# Patient Record
Sex: FEMALE | Race: WHITE
[De-identification: names, ages, dates, MRNs, and addresses within clinical notes are randomized per-mention and may not be internally consistent; named-entity substitution may affect disease eponyms.]

---

## 2018-07-02 ENCOUNTER — HOSPITAL ENCOUNTER (EMERGENCY)
Dept: HOSPITAL 19 - COL.ER | Age: 23
Discharge: HOME | End: 2018-07-02
Payer: SELF-PAY

## 2018-07-02 VITALS — SYSTOLIC BLOOD PRESSURE: 134 MMHG | TEMPERATURE: 98.1 F | DIASTOLIC BLOOD PRESSURE: 91 MMHG

## 2018-07-02 VITALS — WEIGHT: 150.36 LBS | BODY MASS INDEX: 25.67 KG/M2 | HEIGHT: 64.02 IN

## 2018-07-02 VITALS — HEART RATE: 84 BPM

## 2018-07-02 DIAGNOSIS — F17.210: ICD-10-CM

## 2018-07-02 DIAGNOSIS — K02.9: Primary | ICD-10-CM

## 2018-10-16 ENCOUNTER — HOSPITAL ENCOUNTER (EMERGENCY)
Dept: HOSPITAL 19 - COL.ER | Age: 23
Discharge: HOME | End: 2018-10-16
Payer: MEDICAID

## 2018-10-16 VITALS — SYSTOLIC BLOOD PRESSURE: 117 MMHG | DIASTOLIC BLOOD PRESSURE: 48 MMHG | HEART RATE: 61 BPM | TEMPERATURE: 98.4 F

## 2018-10-16 VITALS — HEIGHT: 64.02 IN | BODY MASS INDEX: 29.09 KG/M2 | WEIGHT: 170.42 LBS

## 2018-10-16 DIAGNOSIS — Z3A.00: ICD-10-CM

## 2018-10-16 DIAGNOSIS — O23.90: ICD-10-CM

## 2018-10-16 DIAGNOSIS — R10.2: ICD-10-CM

## 2018-10-16 DIAGNOSIS — O20.0: ICD-10-CM

## 2018-10-16 DIAGNOSIS — O26.899: Primary | ICD-10-CM

## 2018-10-16 DIAGNOSIS — Z87.891: ICD-10-CM

## 2018-10-16 LAB
ALBUMIN SERPL-MCNC: 4.1 GM/DL (ref 3.5–5)
ALP SERPL-CCNC: 47 U/L (ref 50–136)
ALT SERPL-CCNC: 29 U/L (ref 9–52)
ANION GAP SERPL CALC-SCNC: 12 MMOL/L (ref 7–16)
AST SERPL-CCNC: 16 U/L (ref 15–37)
BASOPHILS # BLD: 0 10*3/UL (ref 0–0.2)
BASOPHILS NFR BLD AUTO: 0.3 % (ref 0–2)
BILIRUB SERPL-MCNC: 0.8 MG/DL (ref 0–1)
BUN SERPL-MCNC: 11 MG/DL (ref 7–17)
CALCIUM SERPL-MCNC: 9.3 MG/DL (ref 8.4–10.2)
CHLORIDE SERPL-SCNC: 101 MMOL/L (ref 98–107)
CO2 SERPL-SCNC: 28 MMOL/L (ref 22–30)
CREAT SERPL-SCNC: 0.44 MG/DL (ref 0.52–1.25)
EOSINOPHIL # BLD: 0.1 10*3/UL (ref 0–0.7)
EOSINOPHIL NFR BLD: 0.9 % (ref 0–4)
ERYTHROCYTE [DISTWIDTH] IN BLOOD BY AUTOMATED COUNT: 12.3 % (ref 11.5–14.5)
GLUCOSE SERPL-MCNC: 96 MG/DL (ref 74–106)
GRANULOCYTES # BLD AUTO: 75 % (ref 42.2–75.2)
HCT VFR BLD AUTO: 40.3 % (ref 37–47)
HGB BLD-MCNC: 13.9 G/DL (ref 12.5–16)
LYMPHOCYTES # BLD: 1.7 10*3/UL (ref 1.2–3.4)
LYMPHOCYTES NFR BLD: 18.5 % (ref 20–51)
MCH RBC QN AUTO: 30 PG (ref 27–31)
MCHC RBC AUTO-ENTMCNC: 35 G/DL (ref 33–37)
MCV RBC AUTO: 87 FL (ref 80–100)
MONOCYTES # BLD: 0.5 10*3/UL (ref 0.1–0.6)
MONOCYTES NFR BLD AUTO: 5 % (ref 1.7–9.3)
NEUTROPHILS # BLD: 6.8 10*3/UL (ref 1.4–6.5)
PH UR STRIP.AUTO: 6 [PH] (ref 5–8)
PLATELET # BLD AUTO: 253 K/MM3 (ref 130–400)
PMV BLD AUTO: 9.6 FL (ref 7.4–10.4)
POTASSIUM SERPL-SCNC: 4.1 MMOL/L (ref 3.4–5)
PROT SERPL-MCNC: 7.2 GM/DL (ref 6.4–8.2)
RBC # BLD AUTO: 4.61 M/MM3 (ref 4.1–5.3)
RBC # UR: (no result) /HPF
SODIUM SERPL-SCNC: 140 MMOL/L (ref 137–145)
SP GR UR STRIP.AUTO: 1.02 (ref 1–1.03)
SQUAMOUS # URNS: (no result) /HPF
URINE LEUKOCYTE ESTERASE: (no result)
URN COLLECT METHOD CLASS: (no result)

## 2018-10-19 ENCOUNTER — HOSPITAL ENCOUNTER (EMERGENCY)
Dept: HOSPITAL 19 - COL.ER | Age: 23
Discharge: HOME | End: 2018-10-19
Payer: MEDICAID

## 2018-10-19 VITALS — WEIGHT: 170.42 LBS | HEIGHT: 64.02 IN | BODY MASS INDEX: 29.09 KG/M2

## 2018-10-19 VITALS — SYSTOLIC BLOOD PRESSURE: 118 MMHG | HEART RATE: 84 BPM | DIASTOLIC BLOOD PRESSURE: 83 MMHG

## 2018-10-19 VITALS — TEMPERATURE: 97.2 F

## 2018-10-19 DIAGNOSIS — J06.9: ICD-10-CM

## 2018-10-19 DIAGNOSIS — J45.909: Primary | ICD-10-CM

## 2018-11-12 ENCOUNTER — HOSPITAL ENCOUNTER (EMERGENCY)
Dept: HOSPITAL 19 - COL.ER | Age: 23
Discharge: HOME | End: 2018-11-12
Payer: MEDICAID

## 2018-11-12 VITALS — HEIGHT: 64.02 IN | WEIGHT: 180.34 LBS | BODY MASS INDEX: 30.79 KG/M2

## 2018-11-12 VITALS — DIASTOLIC BLOOD PRESSURE: 68 MMHG | SYSTOLIC BLOOD PRESSURE: 128 MMHG | TEMPERATURE: 98 F | HEART RATE: 91 BPM

## 2018-11-12 DIAGNOSIS — Z3A.10: ICD-10-CM

## 2018-11-12 DIAGNOSIS — R55: ICD-10-CM

## 2018-11-12 DIAGNOSIS — O26.891: Primary | ICD-10-CM

## 2018-11-12 DIAGNOSIS — O99.331: ICD-10-CM

## 2018-11-12 DIAGNOSIS — Z98.890: ICD-10-CM

## 2018-11-12 DIAGNOSIS — F17.210: ICD-10-CM

## 2018-11-12 LAB
ALBUMIN SERPL-MCNC: 4.1 GM/DL (ref 3.5–5)
ALP SERPL-CCNC: 44 U/L (ref 50–136)
ALT SERPL-CCNC: 26 U/L (ref 9–52)
ANION GAP SERPL CALC-SCNC: 6 MMOL/L (ref 7–16)
AST SERPL-CCNC: 19 U/L (ref 15–37)
BASOPHILS # BLD: 0 10*3/UL (ref 0–0.2)
BASOPHILS NFR BLD AUTO: 0.3 % (ref 0–2)
BILIRUB SERPL-MCNC: 0.7 MG/DL (ref 0–1)
BUN SERPL-MCNC: 7 MG/DL (ref 7–17)
CALCIUM SERPL-MCNC: 9.3 MG/DL (ref 8.4–10.2)
CHLORIDE SERPL-SCNC: 105 MMOL/L (ref 98–107)
CO2 SERPL-SCNC: 27 MMOL/L (ref 22–30)
CREAT SERPL-SCNC: 0.43 MG/DL (ref 0.52–1.25)
DIGOXIN SERPL-MCNC: 38.5 NG/ML (ref 3–18.6)
EOSINOPHIL # BLD: 0 10*3/UL (ref 0–0.7)
EOSINOPHIL NFR BLD: 0.5 % (ref 0–4)
ERYTHROCYTE [DISTWIDTH] IN BLOOD BY AUTOMATED COUNT: 12.3 % (ref 11.5–14.5)
GLUCOSE SERPL-MCNC: 119 MG/DL (ref 74–106)
GRANULOCYTES # BLD AUTO: 72.4 % (ref 42.2–75.2)
HCT VFR BLD AUTO: 41.2 % (ref 37–47)
HGB BLD-MCNC: 14.2 G/DL (ref 12.5–16)
HYALINE CASTS #/AREA URNS LPF: (no result) /LPF
LYMPHOCYTES # BLD: 1.7 10*3/UL (ref 1.2–3.4)
LYMPHOCYTES NFR BLD: 21.9 % (ref 20–51)
MAGNESIUM SERPL-MCNC: 1.6 MG/DL (ref 1.6–2.3)
MCH RBC QN AUTO: 30 PG (ref 27–31)
MCHC RBC AUTO-ENTMCNC: 35 G/DL (ref 33–37)
MCV RBC AUTO: 86 FL (ref 80–100)
MONOCYTES # BLD: 0.3 10*3/UL (ref 0.1–0.6)
MONOCYTES NFR BLD AUTO: 4.5 % (ref 1.7–9.3)
NEUTROPHILS # BLD: 5.5 10*3/UL (ref 1.4–6.5)
PH UR STRIP.AUTO: 6 [PH] (ref 5–8)
PHOSPHATE SERPL-MCNC: 3.2 MG/DL (ref 2.5–4.5)
PLATELET # BLD AUTO: 242 K/MM3 (ref 130–400)
PMV BLD AUTO: 9.6 FL (ref 7.4–10.4)
POTASSIUM SERPL-SCNC: 4 MMOL/L (ref 3.4–5)
PROT SERPL-MCNC: 7.3 GM/DL (ref 6.4–8.2)
RBC # BLD AUTO: 4.78 M/MM3 (ref 4.1–5.3)
RBC # UR: (no result) /HPF
SODIUM SERPL-SCNC: 138 MMOL/L (ref 137–145)
SP GR UR STRIP.AUTO: 1.02 (ref 1–1.03)
SQUAMOUS # URNS: (no result) /HPF
TROPONIN I SERPL-MCNC: < 0.012 NG/ML (ref 0–0.03)
UA DIPSTICK PNL UR STRIP.AUTO: (no result)
URN COLLECT METHOD CLASS: (no result)
UROBILINOGEN UR STRIP.AUTO-MCNC: 2 MG/DL
WBC # UR: (no result) /HPF

## 2019-02-04 ENCOUNTER — HOSPITAL ENCOUNTER (OUTPATIENT)
Dept: HOSPITAL 19 - COL.CARD | Age: 24
End: 2019-02-04
Attending: PSYCHIATRY & NEUROLOGY
Payer: MEDICAID

## 2019-02-04 DIAGNOSIS — R55: Primary | ICD-10-CM

## 2019-02-04 DIAGNOSIS — G43.909: ICD-10-CM

## 2019-03-21 ENCOUNTER — HOSPITAL ENCOUNTER (EMERGENCY)
Dept: HOSPITAL 19 - COL.ER | Age: 24
Discharge: HOME | End: 2019-03-21
Payer: MEDICAID

## 2019-03-21 VITALS — TEMPERATURE: 97.6 F

## 2019-03-21 VITALS — SYSTOLIC BLOOD PRESSURE: 112 MMHG | DIASTOLIC BLOOD PRESSURE: 75 MMHG | HEART RATE: 89 BPM

## 2019-03-21 VITALS — BODY MASS INDEX: 31.54 KG/M2 | HEIGHT: 64.02 IN | WEIGHT: 184.75 LBS

## 2019-03-21 DIAGNOSIS — B02.9: ICD-10-CM

## 2019-03-21 DIAGNOSIS — O98.312: Primary | ICD-10-CM

## 2019-03-21 DIAGNOSIS — Z3A.28: ICD-10-CM

## 2019-03-21 DIAGNOSIS — Z87.891: ICD-10-CM

## 2019-04-25 ENCOUNTER — HOSPITAL ENCOUNTER (OUTPATIENT)
Dept: HOSPITAL 19 - LDRO | Age: 24
Discharge: HOME | End: 2019-04-25
Attending: OBSTETRICS & GYNECOLOGY
Payer: MEDICAID

## 2019-04-25 VITALS — BODY MASS INDEX: 31.65 KG/M2 | WEIGHT: 185.41 LBS | HEIGHT: 64.02 IN

## 2019-04-25 VITALS — HEART RATE: 104 BPM | SYSTOLIC BLOOD PRESSURE: 129 MMHG | DIASTOLIC BLOOD PRESSURE: 89 MMHG | TEMPERATURE: 97.9 F

## 2019-04-25 VITALS — HEART RATE: 105 BPM | TEMPERATURE: 97.9 F | DIASTOLIC BLOOD PRESSURE: 76 MMHG | SYSTOLIC BLOOD PRESSURE: 118 MMHG

## 2019-04-25 VITALS — HEART RATE: 100 BPM | DIASTOLIC BLOOD PRESSURE: 68 MMHG | SYSTOLIC BLOOD PRESSURE: 109 MMHG

## 2019-04-25 VITALS — DIASTOLIC BLOOD PRESSURE: 69 MMHG | SYSTOLIC BLOOD PRESSURE: 117 MMHG | HEART RATE: 98 BPM

## 2019-04-25 VITALS — HEART RATE: 101 BPM | SYSTOLIC BLOOD PRESSURE: 118 MMHG | DIASTOLIC BLOOD PRESSURE: 68 MMHG

## 2019-04-25 DIAGNOSIS — Z3A.34: ICD-10-CM

## 2019-04-25 LAB
GLUCOSE UR QL STRIP.AUTO: (no result)
PH UR STRIP.AUTO: 7 [PH] (ref 5–8)
SP GR UR STRIP.AUTO: 1.01 (ref 1–1.03)
SQUAMOUS # URNS: (no result) /HPF
URN COLLECT METHOD CLASS: (no result)
WBC # UR: (no result) /HPF

## 2019-04-25 NOTE — NUR
1731-SVE BY BATOOL,RN 1/60/-2,  on unit. Updated on no change.
Orders to discharge patient home and continue to attend all regularly
scheduled Ob visits.
1737-Patient off EFM.
1745-Discharge instructions reviewd, verbalized understanding of when to
return. Denies questions.
1748-Ambulatory off unit with mother.

## 2019-09-24 NOTE — NUR
1530-G3L2 34.1 WEEK patient of Dr. Pena ambulatory to unit with compaints
of sharp middle right abdominal pain following eating spicy food. Reports
plans to delivery at [facility] due to known fetal heart defect. Assisted into gown
and placed on EFM. VSS, see flow record. SVE 1/50/-3, assessment complete. PO
hydration provided.
1600- updated, see physician notification. Clean catch UA obtained
and sent to lab.
1638-SVE 1/60/-2, recheck by NbaRN who agrees with this RN.
1646-1000mg tylenol given. see EMAR
1650- Dr. Campbell on unit Reviews strip and gives order to monitor another
hour and recheck cervix. Filled per protocol

## 2020-12-31 ENCOUNTER — HOSPITAL ENCOUNTER (EMERGENCY)
Dept: HOSPITAL 19 - COL.ER | Age: 25
Discharge: HOME | End: 2020-12-31
Attending: FAMILY MEDICINE
Payer: SELF-PAY

## 2020-12-31 VITALS — DIASTOLIC BLOOD PRESSURE: 71 MMHG | SYSTOLIC BLOOD PRESSURE: 116 MMHG | HEART RATE: 94 BPM

## 2020-12-31 VITALS — TEMPERATURE: 98.3 F

## 2020-12-31 VITALS — BODY MASS INDEX: 32.52 KG/M2 | WEIGHT: 190.48 LBS | HEIGHT: 64.02 IN

## 2020-12-31 DIAGNOSIS — K04.7: Primary | ICD-10-CM

## 2020-12-31 DIAGNOSIS — Z88.7: ICD-10-CM

## 2020-12-31 DIAGNOSIS — Z87.891: ICD-10-CM

## 2022-01-05 ENCOUNTER — HOSPITAL ENCOUNTER (EMERGENCY)
Dept: HOSPITAL 19 - COL.ER | Age: 27
LOS: 1 days | Discharge: HOME | End: 2022-01-06
Attending: FAMILY MEDICINE
Payer: COMMERCIAL

## 2022-01-05 VITALS — BODY MASS INDEX: 32.52 KG/M2 | HEIGHT: 64.02 IN | WEIGHT: 190.48 LBS

## 2022-01-05 VITALS — TEMPERATURE: 98.2 F

## 2022-01-05 DIAGNOSIS — J45.909: ICD-10-CM

## 2022-01-05 DIAGNOSIS — Z72.0: ICD-10-CM

## 2022-01-05 DIAGNOSIS — M41.9: ICD-10-CM

## 2022-01-05 DIAGNOSIS — M54.16: Primary | ICD-10-CM

## 2022-01-05 DIAGNOSIS — M51.87: ICD-10-CM
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
## 2022-01-06 VITALS — SYSTOLIC BLOOD PRESSURE: 124 MMHG | HEART RATE: 76 BPM | DIASTOLIC BLOOD PRESSURE: 78 MMHG

## 2023-04-04 ENCOUNTER — HOSPITAL ENCOUNTER (EMERGENCY)
Dept: HOSPITAL 19 - COL.ER | Age: 28
Discharge: HOME | End: 2023-04-04
Attending: EMERGENCY MEDICINE
Payer: SELF-PAY

## 2023-04-04 VITALS — TEMPERATURE: 98.4 F

## 2023-04-04 VITALS — SYSTOLIC BLOOD PRESSURE: 120 MMHG | DIASTOLIC BLOOD PRESSURE: 77 MMHG | HEART RATE: 78 BPM

## 2023-04-04 VITALS — HEIGHT: 64.02 IN | BODY MASS INDEX: 31.65 KG/M2 | WEIGHT: 185.41 LBS

## 2023-04-04 DIAGNOSIS — Z28.310: ICD-10-CM

## 2023-04-04 DIAGNOSIS — K52.9: Primary | ICD-10-CM

## 2023-04-04 DIAGNOSIS — F17.290: ICD-10-CM

## 2023-04-04 LAB
ALBUMIN SERPL-MCNC: 4.7 GM/DL (ref 3.5–5)
ALP SERPL-CCNC: 73 U/L (ref 40–150)
ALT SERPL-CCNC: 17 U/L (ref 0–55)
ANION GAP SERPL CALC-SCNC: 17 MMOL/L (ref 7–16)
AST SERPL-CCNC: 18 U/L (ref 5–34)
BASOPHILS # BLD: 0 K/MM3 (ref 0–0.2)
BASOPHILS NFR BLD AUTO: 0.1 % (ref 0–2)
BILIRUB SERPL-MCNC: 1.3 MG/DL (ref 0.2–1.2)
BUN SERPL-MCNC: 13 MG/DL (ref 7–19)
CALCIUM SERPL-MCNC: 10.5 MG/DL (ref 8.4–10.2)
CHLORIDE SERPL-SCNC: 103 MMOL/L (ref 98–107)
CO2 SERPL-SCNC: 19 MMOL/L (ref 22–29)
CREAT SERPL-SCNC: 0.75 MG/DL (ref 0.57–1.11)
EOSINOPHIL # BLD: 0 K/MM3 (ref 0–0.7)
EOSINOPHIL NFR BLD: 0.1 % (ref 0–4)
ERYTHROCYTE [DISTWIDTH] IN BLOOD BY AUTOMATED COUNT: 12.6 % (ref 11.5–14.5)
GLUCOSE SERPL-MCNC: 166 MG/DL (ref 70–99)
GRANULOCYTES # BLD AUTO: 90 % (ref 42.2–75.2)
HCT VFR BLD AUTO: 43.2 % (ref 37–47)
HGB BLD-MCNC: 15.1 G/DL (ref 12.5–16)
KETONES UR STRIP.AUTO-MCNC: (no result) MG/DL
LIPASE SERPL-CCNC: 14 U/L (ref 8–78)
LYMPHOCYTES # BLD: 0.9 K/MM3 (ref 1.2–3.4)
LYMPHOCYTES NFR BLD: 6.5 % (ref 20–51)
MCH RBC QN AUTO: 30 PG (ref 27–31)
MCHC RBC AUTO-ENTMCNC: 35 G/DL (ref 33–37)
MCV RBC AUTO: 86 FL (ref 80–100)
MONOCYTES # BLD: 0.4 K/MM3 (ref 0.1–0.6)
MONOCYTES NFR BLD AUTO: 2.9 % (ref 1.7–9.3)
NEUTROPHILS # BLD: 12.6 K/MM3 (ref 1.4–6.5)
PH UR STRIP.AUTO: >= 9 [PH] (ref 5–8.5)
PLATELET # BLD AUTO: 322 K/MM3 (ref 130–400)
PMV BLD AUTO: 9.4 FL (ref 7.4–10.4)
POTASSIUM SERPL-SCNC: 3.7 MMOL/L (ref 3.5–4.5)
PROT SERPL-MCNC: 8.2 GM/DL (ref 6.2–8.1)
RBC # BLD AUTO: 5.02 M/MM3 (ref 4.1–5.3)
RBC # UR: (no result) /HPF (ref 0–2)
SODIUM SERPL-SCNC: 139 MMOL/L (ref 136–145)
SP GR UR STRIP.AUTO: 1.02 (ref 1–1.03)
SQUAMOUS # URNS: (no result) /HPF (ref 0–10)
UA DIPSTICK PNL UR STRIP.AUTO: (no result)
URN COLLECT METHOD CLASS: (no result)
UROBILINOGEN UR STRIP.AUTO-MCNC: 0.2 E.U/DL (ref 0.2–1)

## 2023-04-07 ENCOUNTER — HOSPITAL ENCOUNTER (EMERGENCY)
Dept: HOSPITAL 19 - COL.ER | Age: 28
Discharge: HOME | End: 2023-04-07
Attending: EMERGENCY MEDICINE
Payer: SELF-PAY

## 2023-04-07 VITALS — HEART RATE: 57 BPM | SYSTOLIC BLOOD PRESSURE: 107 MMHG | DIASTOLIC BLOOD PRESSURE: 72 MMHG | TEMPERATURE: 97.9 F

## 2023-04-07 DIAGNOSIS — Z28.310: ICD-10-CM

## 2023-04-07 DIAGNOSIS — R10.84: Primary | ICD-10-CM

## 2023-04-07 DIAGNOSIS — F17.290: ICD-10-CM

## 2023-04-07 DIAGNOSIS — D72.829: ICD-10-CM

## 2023-04-07 DIAGNOSIS — R11.2: ICD-10-CM

## 2023-04-07 LAB
ALBUMIN SERPL-MCNC: 4.3 GM/DL (ref 3.5–5)
ALP SERPL-CCNC: 61 U/L (ref 40–150)
ALT SERPL-CCNC: 15 U/L (ref 0–55)
ANION GAP SERPL CALC-SCNC: 15 MMOL/L (ref 7–16)
AST SERPL-CCNC: 14 U/L (ref 5–34)
BASOPHILS # BLD: 0 K/MM3 (ref 0–0.2)
BASOPHILS NFR BLD AUTO: 0.3 % (ref 0–2)
BILIRUB SERPL-MCNC: 1.1 MG/DL (ref 0.2–1.2)
BUN SERPL-MCNC: 12 MG/DL (ref 7–19)
CALCIUM SERPL-MCNC: 10 MG/DL (ref 8.4–10.2)
CHLORIDE SERPL-SCNC: 104 MMOL/L (ref 98–107)
CO2 SERPL-SCNC: 21 MMOL/L (ref 22–29)
CREAT SERPL-SCNC: 0.75 MG/DL (ref 0.57–1.11)
CRP SERPL-MCNC: 0.35 MG/DL (ref 0–0.5)
EOSINOPHIL # BLD: 0.1 K/MM3 (ref 0–0.7)
EOSINOPHIL NFR BLD: 0.4 % (ref 0–4)
ERYTHROCYTE [DISTWIDTH] IN BLOOD BY AUTOMATED COUNT: 12.5 % (ref 11.5–14.5)
GLUCOSE SERPL-MCNC: 143 MG/DL (ref 70–99)
GRANULOCYTES # BLD AUTO: 78.8 % (ref 42.2–75.2)
HCG SERPL QL: NEGATIVE
HCT VFR BLD AUTO: 42.7 % (ref 37–47)
HGB BLD-MCNC: 14.8 G/DL (ref 12.5–16)
KETONES UR STRIP.AUTO-MCNC: (no result) MG/DL
LYMPHOCYTES # BLD: 1.7 K/MM3 (ref 1.2–3.4)
LYMPHOCYTES NFR BLD: 14.3 % (ref 20–51)
MCH RBC QN AUTO: 30 PG (ref 27–31)
MCHC RBC AUTO-ENTMCNC: 35 G/DL (ref 33–37)
MCV RBC AUTO: 88 FL (ref 80–100)
MONOCYTES # BLD: 0.7 K/MM3 (ref 0.1–0.6)
MONOCYTES NFR BLD AUTO: 5.7 % (ref 1.7–9.3)
NEUTROPHILS # BLD: 9.6 K/MM3 (ref 1.4–6.5)
PH UR STRIP.AUTO: 8.5 [PH] (ref 5–8.5)
PLATELET # BLD AUTO: 309 K/MM3 (ref 130–400)
PMV BLD AUTO: 9.2 FL (ref 7.4–10.4)
POTASSIUM SERPL-SCNC: 3.4 MMOL/L (ref 3.5–4.5)
PROT SERPL-MCNC: 7.5 GM/DL (ref 6.2–8.1)
RBC # BLD AUTO: 4.87 M/MM3 (ref 4.1–5.3)
RBC # UR STRIP.AUTO: (no result) /UL
RBC # UR: (no result) /HPF (ref 0–2)
SODIUM SERPL-SCNC: 140 MMOL/L (ref 136–145)
SP GR UR STRIP.AUTO: 1.02 (ref 1–1.03)
SQUAMOUS # URNS: (no result) /HPF (ref 0–10)
URN COLLECT METHOD CLASS: (no result)
UROBILINOGEN UR STRIP.AUTO-MCNC: 0.2 E.U/DL (ref 0.2–1)